# Patient Record
Sex: MALE | Race: OTHER | NOT HISPANIC OR LATINO | URBAN - METROPOLITAN AREA
[De-identification: names, ages, dates, MRNs, and addresses within clinical notes are randomized per-mention and may not be internally consistent; named-entity substitution may affect disease eponyms.]

---

## 2023-07-14 LAB — ACHR BINDING AB, SERUM: 0 NMOL/L (ref 0–0.4)

## 2024-08-16 ENCOUNTER — APPOINTMENT (OUTPATIENT)
Dept: RADIOLOGY | Facility: HOSPITAL | Age: 79
DRG: 063 | End: 2024-08-16
Payer: MEDICARE

## 2024-08-16 ENCOUNTER — APPOINTMENT (OUTPATIENT)
Dept: CARDIOLOGY | Facility: HOSPITAL | Age: 79
DRG: 063 | End: 2024-08-16
Payer: MEDICARE

## 2024-08-16 ENCOUNTER — HOSPITAL ENCOUNTER (INPATIENT)
Facility: HOSPITAL | Age: 79
LOS: 2 days | Discharge: HOME | DRG: 063 | End: 2024-08-18
Attending: EMERGENCY MEDICINE | Admitting: INTERNAL MEDICINE
Payer: MEDICARE

## 2024-08-16 DIAGNOSIS — I63.9 CEREBRAL INFARCTION, UNSPECIFIED (MULTI): ICD-10-CM

## 2024-08-16 DIAGNOSIS — R47.1 DYSARTHRIA: Primary | ICD-10-CM

## 2024-08-16 DIAGNOSIS — I63.89 AC ISCH MULTI VASC TERRITORIES STROKE (MULTI): ICD-10-CM

## 2024-08-16 DIAGNOSIS — I63.539: ICD-10-CM

## 2024-08-16 LAB
ALBUMIN SERPL BCP-MCNC: 4 G/DL (ref 3.4–5)
ALP SERPL-CCNC: 73 U/L (ref 33–136)
ALT SERPL W P-5'-P-CCNC: 41 U/L (ref 10–52)
ANION GAP SERPL CALC-SCNC: 12 MMOL/L (ref 10–20)
APTT PPP: 34 SECONDS (ref 27–38)
AST SERPL W P-5'-P-CCNC: 26 U/L (ref 9–39)
BASOPHILS # BLD AUTO: 0.03 X10*3/UL (ref 0–0.1)
BASOPHILS NFR BLD AUTO: 0.5 %
BILIRUB SERPL-MCNC: 0.4 MG/DL (ref 0–1.2)
BNP SERPL-MCNC: 57 PG/ML (ref 0–99)
BUN SERPL-MCNC: 29 MG/DL (ref 6–23)
CALCIUM SERPL-MCNC: 8.7 MG/DL (ref 8.6–10.3)
CARDIAC TROPONIN I PNL SERPL HS: 3 NG/L (ref 0–20)
CHLORIDE SERPL-SCNC: 110 MMOL/L (ref 98–107)
CHOLEST SERPL-MCNC: 129 MG/DL (ref 0–199)
CHOLESTEROL/HDL RATIO: 4
CO2 SERPL-SCNC: 21 MMOL/L (ref 21–32)
CREAT SERPL-MCNC: 1.19 MG/DL (ref 0.5–1.3)
EGFRCR SERPLBLD CKD-EPI 2021: 63 ML/MIN/1.73M*2
EOSINOPHIL # BLD AUTO: 0.45 X10*3/UL (ref 0–0.4)
EOSINOPHIL NFR BLD AUTO: 7.2 %
ERYTHROCYTE [DISTWIDTH] IN BLOOD BY AUTOMATED COUNT: 13.6 % (ref 11.5–14.5)
GLUCOSE BLD MANUAL STRIP-MCNC: 127 MG/DL (ref 74–99)
GLUCOSE BLD MANUAL STRIP-MCNC: 128 MG/DL (ref 74–99)
GLUCOSE SERPL-MCNC: 129 MG/DL (ref 74–99)
HCT VFR BLD AUTO: 34.5 % (ref 41–52)
HDLC SERPL-MCNC: 32.3 MG/DL
HGB BLD-MCNC: 10.7 G/DL (ref 13.5–17.5)
IMM GRANULOCYTES # BLD AUTO: 0.03 X10*3/UL (ref 0–0.5)
IMM GRANULOCYTES NFR BLD AUTO: 0.5 % (ref 0–0.9)
INR PPP: 1 (ref 0.9–1.1)
LDLC SERPL CALC-MCNC: 49 MG/DL
LYMPHOCYTES # BLD AUTO: 1.88 X10*3/UL (ref 0.8–3)
LYMPHOCYTES NFR BLD AUTO: 30.2 %
MCH RBC QN AUTO: 26.7 PG (ref 26–34)
MCHC RBC AUTO-ENTMCNC: 31 G/DL (ref 32–36)
MCV RBC AUTO: 86 FL (ref 80–100)
MONOCYTES # BLD AUTO: 0.74 X10*3/UL (ref 0.05–0.8)
MONOCYTES NFR BLD AUTO: 11.9 %
NEUTROPHILS # BLD AUTO: 3.1 X10*3/UL (ref 1.6–5.5)
NEUTROPHILS NFR BLD AUTO: 49.7 %
NON HDL CHOLESTEROL: 97 MG/DL (ref 0–149)
NRBC BLD-RTO: 0 /100 WBCS (ref 0–0)
PLATELET # BLD AUTO: 155 X10*3/UL (ref 150–450)
POTASSIUM SERPL-SCNC: 4.3 MMOL/L (ref 3.5–5.3)
PROT SERPL-MCNC: 6.9 G/DL (ref 6.4–8.2)
PROTHROMBIN TIME: 11.8 SECONDS (ref 9.8–12.8)
RBC # BLD AUTO: 4.01 X10*6/UL (ref 4.5–5.9)
SARS-COV-2 RNA RESP QL NAA+PROBE: NOT DETECTED
SODIUM SERPL-SCNC: 139 MMOL/L (ref 136–145)
TRIGL SERPL-MCNC: 240 MG/DL (ref 0–149)
VLDL: 48 MG/DL (ref 0–40)
WBC # BLD AUTO: 6.2 X10*3/UL (ref 4.4–11.3)

## 2024-08-16 PROCEDURE — 87635 SARS-COV-2 COVID-19 AMP PRB: CPT

## 2024-08-16 PROCEDURE — 80061 LIPID PANEL: CPT

## 2024-08-16 PROCEDURE — 2550000001 HC RX 255 CONTRASTS: Performed by: EMERGENCY MEDICINE

## 2024-08-16 PROCEDURE — 85610 PROTHROMBIN TIME: CPT | Performed by: EMERGENCY MEDICINE

## 2024-08-16 PROCEDURE — 99291 CRITICAL CARE FIRST HOUR: CPT | Mod: 25 | Performed by: EMERGENCY MEDICINE

## 2024-08-16 PROCEDURE — 70496 CT ANGIOGRAPHY HEAD: CPT | Performed by: SURGERY

## 2024-08-16 PROCEDURE — 85730 THROMBOPLASTIN TIME PARTIAL: CPT | Performed by: EMERGENCY MEDICINE

## 2024-08-16 PROCEDURE — 82947 ASSAY GLUCOSE BLOOD QUANT: CPT

## 2024-08-16 PROCEDURE — 2500000004 HC RX 250 GENERAL PHARMACY W/ HCPCS (ALT 636 FOR OP/ED): Performed by: EMERGENCY MEDICINE

## 2024-08-16 PROCEDURE — 3E03317 INTRODUCTION OF OTHER THROMBOLYTIC INTO PERIPHERAL VEIN, PERCUTANEOUS APPROACH: ICD-10-PCS | Performed by: EMERGENCY MEDICINE

## 2024-08-16 PROCEDURE — 70450 CT HEAD/BRAIN W/O DYE: CPT

## 2024-08-16 PROCEDURE — 71045 X-RAY EXAM CHEST 1 VIEW: CPT | Performed by: SURGERY

## 2024-08-16 PROCEDURE — 71045 X-RAY EXAM CHEST 1 VIEW: CPT

## 2024-08-16 PROCEDURE — 80053 COMPREHEN METABOLIC PANEL: CPT | Performed by: EMERGENCY MEDICINE

## 2024-08-16 PROCEDURE — 99291 CRITICAL CARE FIRST HOUR: CPT | Performed by: EMERGENCY MEDICINE

## 2024-08-16 PROCEDURE — 70498 CT ANGIOGRAPHY NECK: CPT

## 2024-08-16 PROCEDURE — 36415 COLL VENOUS BLD VENIPUNCTURE: CPT | Performed by: EMERGENCY MEDICINE

## 2024-08-16 PROCEDURE — 70450 CT HEAD/BRAIN W/O DYE: CPT | Performed by: RADIOLOGY

## 2024-08-16 PROCEDURE — 99285 EMERGENCY DEPT VISIT HI MDM: CPT | Mod: 25

## 2024-08-16 PROCEDURE — 83036 HEMOGLOBIN GLYCOSYLATED A1C: CPT | Mod: STJLAB

## 2024-08-16 PROCEDURE — 85025 COMPLETE CBC W/AUTO DIFF WBC: CPT | Performed by: EMERGENCY MEDICINE

## 2024-08-16 PROCEDURE — 83880 ASSAY OF NATRIURETIC PEPTIDE: CPT

## 2024-08-16 PROCEDURE — 2500000001 HC RX 250 WO HCPCS SELF ADMINISTERED DRUGS (ALT 637 FOR MEDICARE OP)

## 2024-08-16 PROCEDURE — 2020000001 HC ICU ROOM DAILY

## 2024-08-16 PROCEDURE — 96374 THER/PROPH/DIAG INJ IV PUSH: CPT

## 2024-08-16 PROCEDURE — 84484 ASSAY OF TROPONIN QUANT: CPT | Performed by: EMERGENCY MEDICINE

## 2024-08-16 PROCEDURE — 70498 CT ANGIOGRAPHY NECK: CPT | Performed by: SURGERY

## 2024-08-16 PROCEDURE — 93005 ELECTROCARDIOGRAM TRACING: CPT

## 2024-08-16 RX ORDER — EZETIMIBE 10 MG/1
10 TABLET ORAL DAILY
COMMUNITY

## 2024-08-16 RX ORDER — HYDRALAZINE HYDROCHLORIDE 25 MG/1
25 TABLET, FILM COATED ORAL EVERY 6 HOURS PRN
Status: DISCONTINUED | OUTPATIENT
Start: 2024-08-18 | End: 2024-08-18 | Stop reason: HOSPADM

## 2024-08-16 RX ORDER — ATORVASTATIN CALCIUM 40 MG/1
40 TABLET, FILM COATED ORAL NIGHTLY
Status: DISCONTINUED | OUTPATIENT
Start: 2024-08-16 | End: 2024-08-17

## 2024-08-16 RX ORDER — POLYETHYLENE GLYCOL 3350 17 G/17G
17 POWDER, FOR SOLUTION ORAL DAILY
Status: DISCONTINUED | OUTPATIENT
Start: 2024-08-16 | End: 2024-08-18 | Stop reason: HOSPADM

## 2024-08-16 RX ORDER — GLIPIZIDE 5 MG/1
2.5 TABLET ORAL DAILY
COMMUNITY

## 2024-08-16 RX ORDER — INSULIN LISPRO 100 [IU]/ML
0-5 INJECTION, SOLUTION INTRAVENOUS; SUBCUTANEOUS
Status: DISCONTINUED | OUTPATIENT
Start: 2024-08-17 | End: 2024-08-18 | Stop reason: HOSPADM

## 2024-08-16 RX ORDER — ASPIRIN 81 MG/1
81 TABLET ORAL DAILY
Status: DISCONTINUED | OUTPATIENT
Start: 2024-08-17 | End: 2024-08-18 | Stop reason: HOSPADM

## 2024-08-16 RX ORDER — OMEPRAZOLE 20 MG/1
20 CAPSULE, DELAYED RELEASE ORAL
COMMUNITY

## 2024-08-16 RX ORDER — SITAGLIPTIN AND METFORMIN HYDROCHLORIDE 50; 500 MG/1; MG/1
1 TABLET, FILM COATED, EXTENDED RELEASE ORAL 2 TIMES DAILY
COMMUNITY

## 2024-08-16 RX ORDER — HYDRALAZINE HYDROCHLORIDE 20 MG/ML
10 INJECTION INTRAMUSCULAR; INTRAVENOUS
Status: DISCONTINUED | OUTPATIENT
Start: 2024-08-16 | End: 2024-08-18 | Stop reason: HOSPADM

## 2024-08-16 RX ORDER — PANTOPRAZOLE SODIUM 40 MG/1
40 TABLET, DELAYED RELEASE ORAL
Status: DISCONTINUED | OUTPATIENT
Start: 2024-08-17 | End: 2024-08-18 | Stop reason: HOSPADM

## 2024-08-16 RX ORDER — ATORVASTATIN CALCIUM 40 MG/1
40 TABLET, FILM COATED ORAL NIGHTLY
Status: ON HOLD | COMMUNITY
End: 2024-08-18

## 2024-08-16 RX ORDER — LABETALOL HYDROCHLORIDE 5 MG/ML
10 INJECTION, SOLUTION INTRAVENOUS EVERY 10 MIN PRN
Status: DISCONTINUED | OUTPATIENT
Start: 2024-08-16 | End: 2024-08-18 | Stop reason: HOSPADM

## 2024-08-16 RX ORDER — FINASTERIDE 5 MG/1
5 TABLET, FILM COATED ORAL NIGHTLY
COMMUNITY

## 2024-08-16 RX ORDER — DOXAZOSIN 4 MG/1
4 TABLET ORAL NIGHTLY
COMMUNITY

## 2024-08-16 RX ORDER — METOPROLOL SUCCINATE 100 MG/1
50 TABLET, EXTENDED RELEASE ORAL DAILY
COMMUNITY

## 2024-08-16 RX ORDER — SEMAGLUTIDE 1.34 MG/ML
1 INJECTION, SOLUTION SUBCUTANEOUS
COMMUNITY

## 2024-08-16 RX ADMIN — IOHEXOL 70 ML: 350 INJECTION, SOLUTION INTRAVENOUS at 19:18

## 2024-08-16 RX ADMIN — ATORVASTATIN CALCIUM 40 MG: 40 TABLET, FILM COATED ORAL at 21:45

## 2024-08-16 RX ADMIN — Medication 18 MG: at 18:30

## 2024-08-16 SDOH — SOCIAL STABILITY: SOCIAL INSECURITY: DO YOU FEEL ANYONE HAS EXPLOITED OR TAKEN ADVANTAGE OF YOU FINANCIALLY OR OF YOUR PERSONAL PROPERTY?: NO

## 2024-08-16 SDOH — SOCIAL STABILITY: SOCIAL INSECURITY: ARE YOU OR HAVE YOU BEEN THREATENED OR ABUSED PHYSICALLY, EMOTIONALLY, OR SEXUALLY BY ANYONE?: NO

## 2024-08-16 SDOH — SOCIAL STABILITY: SOCIAL INSECURITY: ABUSE: ADULT

## 2024-08-16 SDOH — SOCIAL STABILITY: SOCIAL INSECURITY: ARE THERE ANY APPARENT SIGNS OF INJURIES/BEHAVIORS THAT COULD BE RELATED TO ABUSE/NEGLECT?: NO

## 2024-08-16 SDOH — SOCIAL STABILITY: SOCIAL INSECURITY: HAVE YOU HAD THOUGHTS OF HARMING ANYONE ELSE?: NO

## 2024-08-16 SDOH — SOCIAL STABILITY: SOCIAL INSECURITY: HAVE YOU HAD ANY THOUGHTS OF HARMING ANYONE ELSE?: NO

## 2024-08-16 SDOH — SOCIAL STABILITY: SOCIAL INSECURITY: DOES ANYONE TRY TO KEEP YOU FROM HAVING/CONTACTING OTHER FRIENDS OR DOING THINGS OUTSIDE YOUR HOME?: NO

## 2024-08-16 SDOH — SOCIAL STABILITY: SOCIAL INSECURITY: DO YOU FEEL UNSAFE GOING BACK TO THE PLACE WHERE YOU ARE LIVING?: NO

## 2024-08-16 SDOH — SOCIAL STABILITY: SOCIAL INSECURITY: HAS ANYONE EVER THREATENED TO HURT YOUR FAMILY OR YOUR PETS?: NO

## 2024-08-16 SDOH — SOCIAL STABILITY: SOCIAL INSECURITY: WERE YOU ABLE TO COMPLETE ALL THE BEHAVIORAL HEALTH SCREENINGS?: YES

## 2024-08-16 ASSESSMENT — COGNITIVE AND FUNCTIONAL STATUS - GENERAL
DAILY ACTIVITIY SCORE: 24
MOBILITY SCORE: 24
PATIENT BASELINE BEDBOUND: NO
MOBILITY SCORE: 24
DAILY ACTIVITIY SCORE: 24

## 2024-08-16 ASSESSMENT — LIFESTYLE VARIABLES
HOW OFTEN DURING THE LAST YEAR HAVE YOU BEEN UNABLE TO REMEMBER WHAT HAPPENED THE NIGHT BEFORE BECAUSE YOU HAD BEEN DRINKING: NEVER
HAS A RELATIVE, FRIEND, DOCTOR, OR ANOTHER HEALTH PROFESSIONAL EXPRESSED CONCERN ABOUT YOUR DRINKING OR SUGGESTED YOU CUT DOWN: NO
HOW OFTEN DO YOU HAVE 6 OR MORE DRINKS ON ONE OCCASION: NEVER
HOW OFTEN DO YOU HAVE A DRINK CONTAINING ALCOHOL: NEVER
HOW OFTEN DO YOU HAVE A DRINK CONTAINING ALCOHOL: 2-4 TIMES A MONTH
SUBSTANCE_ABUSE_PAST_12_MONTHS: NO
HOW OFTEN DURING THE LAST YEAR HAVE YOU HAD A FEELING OF GUILT OR REMORSE AFTER DRINKING: NEVER
AUDIT TOTAL SCORE: 3
HOW MANY STANDARD DRINKS CONTAINING ALCOHOL DO YOU HAVE ON A TYPICAL DAY: 3 OR 4
HOW OFTEN DURING THE LAST YEAR HAVE YOU FOUND THAT YOU WERE NOT ABLE TO STOP DRINKING ONCE YOU HAD STARTED: NEVER
AUDIT-C TOTAL SCORE: 0
HAVE YOU OR SOMEONE ELSE BEEN INJURED AS A RESULT OF YOUR DRINKING: NO
PRESCIPTION_ABUSE_PAST_12_MONTHS: NO
SKIP TO QUESTIONS 9-10: 1
HOW OFTEN DURING THE LAST YEAR HAVE YOU NEEDED AN ALCOHOLIC DRINK FIRST THING IN THE MORNING TO GET YOURSELF GOING AFTER A NIGHT OF HEAVY DRINKING: NEVER
HOW OFTEN DURING THE LAST YEAR HAVE YOU FAILED TO DO WHAT WAS NORMALLY EXPECTED FROM YOU BECAUSE OF DRINKING: NEVER
AUDIT-C TOTAL SCORE: 0
SKIP TO QUESTIONS 9-10: 0
AUDIT-C TOTAL SCORE: 3
HOW OFTEN DO YOU HAVE SIX OR MORE DRINKS ON ONE OCCASION: NEVER
HOW MANY STANDARD DRINKS CONTAINING ALCOHOL DO YOU HAVE ON A TYPICAL DAY: PATIENT DOES NOT DRINK

## 2024-08-16 ASSESSMENT — PAIN - FUNCTIONAL ASSESSMENT
PAIN_FUNCTIONAL_ASSESSMENT: 0-10
PAIN_FUNCTIONAL_ASSESSMENT: 0-10

## 2024-08-16 ASSESSMENT — ACTIVITIES OF DAILY LIVING (ADL)
DRESSING YOURSELF: INDEPENDENT
WALKS IN HOME: INDEPENDENT
BATHING: INDEPENDENT
PATIENT'S MEMORY ADEQUATE TO SAFELY COMPLETE DAILY ACTIVITIES?: YES
HEARING - RIGHT EAR: FUNCTIONAL
ADEQUATE_TO_COMPLETE_ADL: YES
HEARING - LEFT EAR: FUNCTIONAL
FEEDING YOURSELF: INDEPENDENT
JUDGMENT_ADEQUATE_SAFELY_COMPLETE_DAILY_ACTIVITIES: YES
LACK_OF_TRANSPORTATION: NO
TOILETING: INDEPENDENT
GROOMING: INDEPENDENT

## 2024-08-16 ASSESSMENT — PATIENT HEALTH QUESTIONNAIRE - PHQ9
2. FEELING DOWN, DEPRESSED OR HOPELESS: NOT AT ALL
1. LITTLE INTEREST OR PLEASURE IN DOING THINGS: NOT AT ALL
SUM OF ALL RESPONSES TO PHQ9 QUESTIONS 1 & 2: 0

## 2024-08-16 ASSESSMENT — PAIN SCALES - GENERAL
PAINLEVEL_OUTOF10: 0 - NO PAIN

## 2024-08-16 NOTE — ED PROVIDER NOTES
Emergency Department Provider Note        History of Present Illness     History provided by: Patient  Limitations to History: None  External Records Reviewed with Brief Summary: Outpatient progress note from 2024 which showed history of colon cancer, UTIs, HLD, smoker, NIDDM 2, and CAD    HPI:  Justin Contreras is a 79 y.o. male with history of NIDDM 2, CAD, HLD, colon cancer, and occasional vaping presenting to the ED with complaint of slurred speech.  Patient was said to be dysarthric 30 minutes prior to arrival while he was driving in which wife had him come to the emergency department.  Patient endorses also feeling fatigued for the past few days.  Denies coughing, GI symptoms, fevers, or urinary symptoms.    Last Known Well Time: 172    Physical Exam   Triage vitals:  T 36.2 °C (97.2 °F)  HR 85  /87  RR 18  O2 99 % None (Room air)    General: Awake, alert, in no acute distress  Eyes: Gaze conjugate.  No scleral icterus or injection  HENT: Normo-cephalic, atraumatic. No stridor.  CV: Regular rate, regular rhythm. Radial pulses 2+ bilaterally  Resp: Breathing non-labored, speaking in full sentences.  Clear to auscultation bilaterally  GI: Soft, non-distended, non-tender. No rebound or guarding.  MSK/Extremities: No gross bony deformities. Moving all extremities  Skin: Warm. Appropriate color  Neuro: see below for NIHSS  Psych: Appropriate mood and affect    NIH Stroke Scale  Time: 1800  Person Administering Scale: Luis Belle MD    1a  Level of consciousness: 0=alert; keenly responsive   1b. LOC questions:  0=Performs both tasks correctly   1c. LOC commands: 0=Performs both tasks correctly   2.  Best Gaze: 0=normal   3. Visual: 0=No visual loss   4. Facial Palsy: 0=Normal symmetric movement   5a. Motor left arm: 0=No drift, limb holds 90 (or 45) degrees for full 10 seconds   5b.  Motor right arm: 0=No drift, limb holds 90 (or 45) degrees for full 10 seconds   6a. Motor left le=No drift, limb  holds 90 (or 45) degrees for full 10 seconds   6b  Motor right le=No drift, limb holds 90 (or 45) degrees for full 10 seconds   7. Limb Ataxia: 2=Present in 2 limbs   8.  Sensory: 0=Normal; no sensory loss   9. Best Language:  0=No aphasia, normal   10. Dysarthria: 1=Mild to moderate, patient slurs at least some words and at worst, can be understood with some difficulty   11. Extinction and Inattention: 0=No abnormality     Total:   3     VAN: Negative    Medical Decision Making & ED Course   Medical Decision Makin y.o. male presenting to the ED with complaint of dysarthria 30 minutes prior to arrival.  Patient is afebrile, sinus, hypertensive, saturating well on room air, and in no acute distress.  On exam patient has mild dysarthria and shakiness with left hand finger-to-nose worse than the right as well as an ataxic gait.  Brain attack ordered and CT head without contrast negative for ICH.  Discussed findings with neurologist per ED course.  TNK administered.  ----  Scoring Tools Utilized: NIH Stroke Scale: 3       Differential diagnoses considered include but are not limited to: CVA, UTI, encephalopathy, electrolyte abnormality, COVID,    Social Determinants of Health which Significantly Impact Care: None identified     EKG Independent Interpretation: EKG interpreted by myself. Please see ED Course for full interpretation.    Independent Result Review and Interpretation: Relevant laboratory and radiographic results were reviewed and independently interpreted by myself.  As necessary, they are commented on in the ED Course.    Chronic conditions affecting the patient's care: As documented above in [facility]    The patient was discussed with the following consultants/services: Neurology regarding NIHSS and concern for cerebellar CVA.  Admitting ICU service who agreed to accept the patient.    Care Considerations: As documented above in [facility]    IV Thrombolysis IV Thrombolysis Checklist        IV Thrombolysis  Given: Yes Thrombolysis Administration: administration time 1830 Patient meets inclusion and exclusion criteria with expected benefits exceeding the risks of IV thrombolysis therapy or withholding therapy. Patient/ family understand potential risks & benefits and consent to IV Thrombolysis. Discussed the diagnosis of suspected ischemic stroke and options for treatment, including alternative treatments. For patients meeting inclusion and exclusion criteria, the expected benefits exceed the risks of IV thrombolysis therapy or withholding therapy. The main risk of IV thrombolysis therapy is bleeding into the brain or body that may require blood transfusions or lead to death. In clinical studies, the rate of death was not higher in patients who received IV thrombolysis compared to those who did not. Other risks include allergic reactions, and with any procedure there is always the possibility of an unexpected complication.  Patient is <18 - [place] [facility] [place] with Acute suspected Stroke & the Pediatric IV Thrombolysis Consent. Consent is completed on a paper document and if criteria is met/ benefit outweigh the risk the thrombolytic Alteplase should be given.  :99}Were there delays to thrombolysis administration?: no      ED Course:  ED Course as of 08/17/24 0002   Fri Aug 16, 2024   1815 CT brain attack head wo IV contrast  Unimpressive for ICH or mass. [ES]   1817 Spoke with neurologist, Dr. Fischer, who agrees against TNK at this time due to lack of disabling deficits but recommends for admission due for CVA work-up due to age and risk factors with symptoms. [ES]   1822 Supervising attending appreciated mild left-sided finger to nose ataxia, left worse then right, and unsteadiness on gait. Attending spoke with neurologist for concern of possible cerbellar stroke and desire for TNK administration. TNK agreed upon and ordered. [ES]   1900 XR chest 1 view  Patchy opacities in  lobes bilaterally suggestive of atelectasis versus pneumonia.  Patient does not clinically have pneumonia, saturating well on room air. BNP ordered to assess for CHF.  No widening of the mediastinum, cardiomegaly, or consolidations. [ES]   1908 Patient reassessed and reports feeling somewhat better and having more clarity.  [ES]      ED Course User Index  [ES] Luis Belle MD         Diagnoses as of 08/17/24 0002   Dysarthria       Disposition   As a result of their workup, the patient will require admission to the hospital.  The patient was informed of his diagnosis.  The patient was given the opportunity to ask questions and I answered them. The patient agreed to be admitted to the hospital.    Procedures   Critical Care    Performed by: Lenny Hanna DO  Authorized by: Lenny Hanna DO    Critical care provider statement:     Critical care time (minutes):  35    Critical care time was exclusive of:  Separately billable procedures and treating other patients and teaching time    Critical care was necessary to treat or prevent imminent or life-threatening deterioration of the following conditions:  CNS failure or compromise (CVA given TNK)    Critical care was time spent personally by me on the following activities:  Discussions with consultants, development of treatment plan with patient or surrogate, examination of patient, ordering and review of radiographic studies, ordering and review of laboratory studies and ordering and performing treatments and interventions      Patient seen and discussed with ED attending physician.    Luis Belle MD  Emergency Medicine     Luis Belle MD  Resident  08/17/24 0002    The patient was seen by the resident/fellow.  I have personally performed a substantive portion of the encounter.  I have seen and examined the patient; agree with the workup, evaluation, MDM, management and diagnosis.  The care plan has been discussed with the resident; I have reviewed the resident’s  note and agree with the documented findings.      I was present for the entirety of the procedure(s).                              Lenny Hanna,   08/20/24 1005

## 2024-08-17 ENCOUNTER — APPOINTMENT (OUTPATIENT)
Dept: RADIOLOGY | Facility: HOSPITAL | Age: 79
DRG: 063 | End: 2024-08-17
Payer: MEDICARE

## 2024-08-17 LAB
ALBUMIN SERPL BCP-MCNC: 3.7 G/DL (ref 3.4–5)
ALP SERPL-CCNC: 65 U/L (ref 33–136)
ALT SERPL W P-5'-P-CCNC: 35 U/L (ref 10–52)
ANION GAP SERPL CALC-SCNC: 13 MMOL/L (ref 10–20)
AST SERPL W P-5'-P-CCNC: 20 U/L (ref 9–39)
ATRIAL RATE: 73 BPM
BASOPHILS # BLD AUTO: 0.03 X10*3/UL (ref 0–0.1)
BASOPHILS NFR BLD AUTO: 0.5 %
BILIRUB SERPL-MCNC: 0.4 MG/DL (ref 0–1.2)
BUN SERPL-MCNC: 24 MG/DL (ref 6–23)
CALCIUM SERPL-MCNC: 8.9 MG/DL (ref 8.6–10.3)
CHLORIDE SERPL-SCNC: 112 MMOL/L (ref 98–107)
CO2 SERPL-SCNC: 19 MMOL/L (ref 21–32)
CREAT SERPL-MCNC: 1.04 MG/DL (ref 0.5–1.3)
EGFRCR SERPLBLD CKD-EPI 2021: 74 ML/MIN/1.73M*2
EOSINOPHIL # BLD AUTO: 0.49 X10*3/UL (ref 0–0.4)
EOSINOPHIL NFR BLD AUTO: 7.9 %
ERYTHROCYTE [DISTWIDTH] IN BLOOD BY AUTOMATED COUNT: 13.5 % (ref 11.5–14.5)
EST. AVERAGE GLUCOSE BLD GHB EST-MCNC: 148 MG/DL
GLUCOSE BLD MANUAL STRIP-MCNC: 116 MG/DL (ref 74–99)
GLUCOSE BLD MANUAL STRIP-MCNC: 120 MG/DL (ref 74–99)
GLUCOSE BLD MANUAL STRIP-MCNC: 136 MG/DL (ref 74–99)
GLUCOSE SERPL-MCNC: 103 MG/DL (ref 74–99)
HBA1C MFR BLD: 6.8 %
HCT VFR BLD AUTO: 32.1 % (ref 41–52)
HGB BLD-MCNC: 10.1 G/DL (ref 13.5–17.5)
IMM GRANULOCYTES # BLD AUTO: 0.02 X10*3/UL (ref 0–0.5)
IMM GRANULOCYTES NFR BLD AUTO: 0.3 % (ref 0–0.9)
LYMPHOCYTES # BLD AUTO: 1.36 X10*3/UL (ref 0.8–3)
LYMPHOCYTES NFR BLD AUTO: 21.9 %
MAGNESIUM SERPL-MCNC: 1.46 MG/DL (ref 1.6–2.4)
MCH RBC QN AUTO: 26.5 PG (ref 26–34)
MCHC RBC AUTO-ENTMCNC: 31.5 G/DL (ref 32–36)
MCV RBC AUTO: 84 FL (ref 80–100)
MONOCYTES # BLD AUTO: 0.74 X10*3/UL (ref 0.05–0.8)
MONOCYTES NFR BLD AUTO: 11.9 %
NEUTROPHILS # BLD AUTO: 3.58 X10*3/UL (ref 1.6–5.5)
NEUTROPHILS NFR BLD AUTO: 57.5 %
NRBC BLD-RTO: 0 /100 WBCS (ref 0–0)
P AXIS: 42 DEGREES
P OFFSET: 188 MS
P ONSET: 138 MS
PHOSPHATE SERPL-MCNC: 4.3 MG/DL (ref 2.5–4.9)
PLATELET # BLD AUTO: 143 X10*3/UL (ref 150–450)
POTASSIUM SERPL-SCNC: 4.1 MMOL/L (ref 3.5–5.3)
PR INTERVAL: 162 MS
PROT SERPL-MCNC: 6.4 G/DL (ref 6.4–8.2)
Q ONSET: 219 MS
QRS COUNT: 12 BEATS
QRS DURATION: 80 MS
QT INTERVAL: 362 MS
QTC CALCULATION(BAZETT): 398 MS
QTC FREDERICIA: 386 MS
R AXIS: 7 DEGREES
RBC # BLD AUTO: 3.81 X10*6/UL (ref 4.5–5.9)
SODIUM SERPL-SCNC: 140 MMOL/L (ref 136–145)
T AXIS: 37 DEGREES
T OFFSET: 400 MS
VENTRICULAR RATE: 73 BPM
WBC # BLD AUTO: 6.2 X10*3/UL (ref 4.4–11.3)

## 2024-08-17 PROCEDURE — 70551 MRI BRAIN STEM W/O DYE: CPT

## 2024-08-17 PROCEDURE — 2500000004 HC RX 250 GENERAL PHARMACY W/ HCPCS (ALT 636 FOR OP/ED)

## 2024-08-17 PROCEDURE — 80053 COMPREHEN METABOLIC PANEL: CPT

## 2024-08-17 PROCEDURE — 83735 ASSAY OF MAGNESIUM: CPT

## 2024-08-17 PROCEDURE — 99223 1ST HOSP IP/OBS HIGH 75: CPT | Performed by: PSYCHIATRY & NEUROLOGY

## 2024-08-17 PROCEDURE — 82947 ASSAY GLUCOSE BLOOD QUANT: CPT

## 2024-08-17 PROCEDURE — 85025 COMPLETE CBC W/AUTO DIFF WBC: CPT

## 2024-08-17 PROCEDURE — 2500000001 HC RX 250 WO HCPCS SELF ADMINISTERED DRUGS (ALT 637 FOR MEDICARE OP)

## 2024-08-17 PROCEDURE — 84100 ASSAY OF PHOSPHORUS: CPT

## 2024-08-17 PROCEDURE — 1100000001 HC PRIVATE ROOM DAILY

## 2024-08-17 PROCEDURE — 36415 COLL VENOUS BLD VENIPUNCTURE: CPT

## 2024-08-17 PROCEDURE — 99291 CRITICAL CARE FIRST HOUR: CPT

## 2024-08-17 PROCEDURE — 97161 PT EVAL LOW COMPLEX 20 MIN: CPT | Mod: GP | Performed by: PHYSICAL THERAPIST

## 2024-08-17 PROCEDURE — 70551 MRI BRAIN STEM W/O DYE: CPT | Performed by: RADIOLOGY

## 2024-08-17 PROCEDURE — 2500000002 HC RX 250 W HCPCS SELF ADMINISTERED DRUGS (ALT 637 FOR MEDICARE OP, ALT 636 FOR OP/ED)

## 2024-08-17 PROCEDURE — S4991 NICOTINE PATCH NONLEGEND: HCPCS

## 2024-08-17 RX ORDER — METOPROLOL SUCCINATE 50 MG/1
50 TABLET, EXTENDED RELEASE ORAL DAILY
Status: DISCONTINUED | OUTPATIENT
Start: 2024-08-17 | End: 2024-08-18 | Stop reason: HOSPADM

## 2024-08-17 RX ORDER — NICOTINE 7MG/24HR
1 PATCH, TRANSDERMAL 24 HOURS TRANSDERMAL DAILY
Status: DISCONTINUED | OUTPATIENT
Start: 2024-08-17 | End: 2024-08-18 | Stop reason: HOSPADM

## 2024-08-17 RX ORDER — SODIUM BICARBONATE 650 MG/1
650 TABLET ORAL ONCE
Status: DISCONTINUED | OUTPATIENT
Start: 2024-08-17 | End: 2024-08-17

## 2024-08-17 RX ORDER — MAGNESIUM SULFATE HEPTAHYDRATE 40 MG/ML
2 INJECTION, SOLUTION INTRAVENOUS ONCE
Status: COMPLETED | OUTPATIENT
Start: 2024-08-17 | End: 2024-08-17

## 2024-08-17 RX ORDER — LANOLIN ALCOHOL/MO/W.PET/CERES
400 CREAM (GRAM) TOPICAL DAILY
Status: DISCONTINUED | OUTPATIENT
Start: 2024-08-17 | End: 2024-08-18 | Stop reason: HOSPADM

## 2024-08-17 RX ORDER — IBUPROFEN 200 MG
1 TABLET ORAL DAILY
Status: DISCONTINUED | OUTPATIENT
Start: 2024-08-17 | End: 2024-08-17

## 2024-08-17 RX ORDER — ATORVASTATIN CALCIUM 80 MG/1
80 TABLET, FILM COATED ORAL NIGHTLY
Status: DISCONTINUED | OUTPATIENT
Start: 2024-08-17 | End: 2024-08-18 | Stop reason: HOSPADM

## 2024-08-17 RX ADMIN — MAGNESIUM SULFATE HEPTAHYDRATE 2 G: 40 INJECTION, SOLUTION INTRAVENOUS at 05:05

## 2024-08-17 RX ADMIN — METOPROLOL SUCCINATE 50 MG: 50 TABLET, EXTENDED RELEASE ORAL at 08:31

## 2024-08-17 RX ADMIN — NICOTINE 1 PATCH: 21 PATCH, EXTENDED RELEASE TRANSDERMAL at 08:31

## 2024-08-17 RX ADMIN — ATORVASTATIN CALCIUM 80 MG: 80 TABLET, FILM COATED ORAL at 21:52

## 2024-08-17 RX ADMIN — SODIUM BICARBONATE 650 MG: 650 TABLET ORAL at 10:17

## 2024-08-17 RX ADMIN — PANTOPRAZOLE SODIUM 40 MG: 40 TABLET, DELAYED RELEASE ORAL at 06:43

## 2024-08-17 RX ADMIN — Medication 400 MG: at 10:17

## 2024-08-17 ASSESSMENT — PAIN SCALES - GENERAL
PAINLEVEL_OUTOF10: 0 - NO PAIN

## 2024-08-17 ASSESSMENT — PAIN - FUNCTIONAL ASSESSMENT
PAIN_FUNCTIONAL_ASSESSMENT: 0-10

## 2024-08-17 ASSESSMENT — COGNITIVE AND FUNCTIONAL STATUS - GENERAL
MOBILITY SCORE: 24
DAILY ACTIVITIY SCORE: 24
DAILY ACTIVITIY SCORE: 24

## 2024-08-17 NOTE — PROGRESS NOTES
Physical Therapy    Physical Therapy Evaluation    Patient Name: Justin Contreras  MRN: [id number]  Today's Date: 8/17/2024   Time Calculation  Start Time: 1035  Stop Time: 1050  Time Calculation (min): 15 min  2121/2121-A    Assessment/Plan   PT Assessment  Evaluation/Treatment Tolerance: Patient tolerated treatment well  Medical Staff Made Aware: Yes  End of Session Communication: Bedside nurse  Assessment Comment: Pt is a 79 y.o. male admitted for Dysarthria [R47.1] on 8/16/2024. Pt below functional level and will benefit from skilled therapy during stay to improve overall functional mobility, strength, ROM, endurance and safety awareness. Pt does not require any further therapy at this time.     End of Session Patient Position: Bed, 3 rail up  IP OR SWING BED PT PLAN  Inpatient or Swing Bed: Inpatient  PT Plan  PT Plan: PT Eval only  PT Discharge Recommendations: No further acute PT, No PT needed after discharge  PT Recommended Transfer Status: Independent  PT - OK to Discharge: Yes    Subjective     Current Problem:  1. Dysarthria          Patient Active Problem List   Diagnosis    Dysarthria       General Visit Information:  General  Reason for Referral: impaired mobility  Referred By: Dr. English  Past Medical History Relevant to Rehab: TNK recieved at 1630 8/16  Family/Caregiver Present: Yes  Caregiver Feedback: wife present  Prior to Session Communication: Bedside nurse  Patient Position Received: Bed, 3 rail up  Preferred Learning Style: kinesthetic, verbal  General Comment: Pt agreeable to therapy    Home Living:  Home Living  Type of Home: House  Home Layout: Two level, Bed/bath upstairs, Stairs to alternate level with rails  Alternate Level Stairs-Rails: Right  Alternate Level Stairs-Number of Steps: 13    Prior Level of Function:  Prior Function Per Pt/Caregiver Report  Level of [place]: Independent with ADLs and functional transfers, Independent with homemaking with  ambulation    Precautions:  Precautions  Medical Precautions: Fall precautions    Vital Signs: WFL     Objective     Pain:0/10       Cognition:  Cognition  Overall Cognitive Status: Within Functional Limits  Orientation Level: Oriented X4    General Assessments:      Activity Tolerance  Endurance: Tolerates 30 min exercise with multiple rests        Static Sitting Balance  Static Sitting-Comment/Number of Minutes: good  Dynamic Sitting Balance  Dynamic Sitting-Comments: good  Static Standing Balance  Static Standing-Comment/Number of Minutes: good  Dynamic Standing Balance  Dynamic Standing-Comments: good    Functional Assessments:     Bed Mobility  Bed Mobility: Yes  Bed Mobility 1  Bed Mobility 1: Supine to sitting  Level of Assistance 1: Modified independent  Transfers  Transfer: Yes  Transfer 1  Technique 1: Sit to stand, Stand to sit  Transfer Level of Assistance 1: Independent  Ambulation/Gait Training  Ambulation/Gait Training Performed: Yes  Ambulation/Gait Training 1  Surface 1: Level tile  Device 1: No device  Assistance 1: Independent  Comments/Distance (ft) 1: 300     Extremity/Trunk Assessments:        RLE   RLE : Within Functional Limits  LLE   LLE : Within Functional Limits    Outcome Measures:     [facility] Basic Mobility  Turning from your back to your side while in a flat bed without using bedrails: None  Moving from lying on your back to sitting on the side of a flat bed without using bedrails: None  Moving to and from bed to chair (including a wheelchair): None  Standing up from a chair using your arms (e.g. wheelchair or bedside chair): None  To walk in hospital room: None  Climbing 3-5 steps with railing: None  Basic Mobility - Total Score: 24       Education Documentation  Body Mechanics, taught by Nasrin Ward PT at 8/17/2024 12:24 PM.  Learner: Patient  Readiness: Acceptance  Method: Explanation  Response: Verbalizes Understanding    Mobility Training, taught by Nasrin Ward PT at  8/17/2024 12:24 PM.  Learner: Patient  Readiness: Acceptance  Method: Explanation  Response: Verbalizes Understanding    Education Comments  No comments found.

## 2024-08-17 NOTE — ASSESSMENT & PLAN NOTE
This is a 79 year old male presenting for evaluation of stroke.  He presented with dysarthria and limb ataxia.  TNK was administered at 18:30.  Currently, he has no deficits on exam (NIHSS 0)    Post-TNK Care  - hold AP/AC agents for now until imaging 24 hours post TNK has shown no ICH  - MRI Brain  - Echocardiogram  - goal BP < 180/105  - goal LDL < 70; continue Atorvastatin  - goal hemoglobin A1C < 6.8  - encouraged cessation of vaping  - neuro checks per protocal  - cardiac telemetry  - SCDs for DVT proph    Case discussed with Dr. English,    Alessio Fischer MD  [facility]  Department of Neurology

## 2024-08-17 NOTE — PROGRESS NOTES
Spiritual Care Visit    Clinical Encounter Type  Visited With: Patient and family together  Routine Visit: Introduction          I had a nice, short visit with patient and his wife.  He said he was feeling normal again and how important health is.  We briefly talked about the weather and I thanked them for letting me stop to visit.

## 2024-08-17 NOTE — CARE PLAN
Problem: General Stroke  Goal: Establish a mutual long term goal with patient by discharge  Outcome: Progressing  Goal: Demonstrate improvement in neurological exam throughout the shift  Outcome: Progressing  Goal: Maintain BP within ordered limits throughout shift  Outcome: Progressing  Goal: Participate in treatment (ie., meds, therapy) throughout shift  Outcome: Progressing  Goal: No symptoms of aspiration throughout shift  Outcome: Progressing  Goal: No symptoms of hemorrhage throughout shift  Outcome: Progressing  Goal: Tolerate enteral feeding throughout shift  Outcome: Progressing  Goal: Decreased nausea/vomiting throughout shift  Outcome: Progressing  Goal: Controlled blood glucose throughout shift  Outcome: Progressing  Goal: Out of bed three times today  Outcome: Progressing     Problem: ICU Stroke  Goal: Maintain ICP within ordered limits throughout shift  Outcome: Progressing  Goal: Tolerate EVD clamping trial throughout shift  Outcome: Progressing  Goal: Tolerate ventilator weaning trial during shift  Outcome: Progressing  Goal: Maintain patent airway throughout shift  Outcome: Progressing  Goal: Achieve/maintain targeted sodium level throughout shift  Outcome: Progressing     Problem: Pain - Adult  Goal: Verbalizes/displays adequate comfort level or baseline comfort level  Outcome: Progressing     Problem: Safety - Adult  Goal: Free from fall injury  Outcome: Progressing     Problem: Discharge Planning  Goal: Discharge to home or other facility with appropriate resources  Outcome: Progressing     Problem: Chronic Conditions and Co-morbidities  Goal: Patient's chronic conditions and co-morbidity symptoms are monitored and maintained or improved  Outcome: Progressing       The clinical goals for the shift include patient to remain hemodynamically stable

## 2024-08-17 NOTE — CARE PLAN
The patient's goals for the shift include  complete recovery     The clinical goals for the shift include patient to remain hemodynamically stable    Over the shift, the patient did not make progress toward the following goals. Barriers to progression include acuteness of illness. Recommendations to address these barriers include continue with plan of care.

## 2024-08-17 NOTE — PROGRESS NOTES
Occupational Therapy                 Therapy Communication Note    Patient Name: Justin Contreras  MRN: [id number]  Today's Date: 8/17/2024     Discipline: Occupational Therapy    Screen: Chart review completed. Patient is independent and back to baseline per PT. Will discharge OT order at this time.

## 2024-08-17 NOTE — PROGRESS NOTES
Critical Care Daily Progress        Subjective   Patient is a 79 y.o. male admitted on 8/16/2024  6:04 PM with the following indication(s) for ICU care CVA. h/o DM, CAD s/p CABG 2016 who received TNK after NIHSS of 3  pm yesterday with persistent numbness in his fingertips and soles of his feet.     Overnight Events: NAEO    Scheduled Medications:   aspirin, 81 mg, oral, Daily  atorvastatin, 40 mg, oral, Nightly  insulin lispro, 0-5 Units, subcutaneous, TID  metoprolol succinate XL, 50 mg, oral, Daily  nicotine, 1 patch, transdermal, Daily  pantoprazole, 40 mg, oral, Daily before breakfast  polyethylene glycol, 17 g, oral, Daily         Continuous Medications:         PRN Medications:   PRN medications: hydrALAZINE **FOLLOWED BY** [START ON 8/18/2024] hydrALAZINE, labetaloL, oxygen    Objective   Vitals:  Temp  Min: 35.4 °C (95.7 °F)  Max: 37 °C (98.6 °F)  Pulse  Min: 58  Max: 110  BP  Min: 107/57  Max: 176/104  Resp  Min: 15  Max: 42  SpO2  Min: 96 %  Max: 100 %    Physical Exam:   Physical Exam   Physical Exam    General: Alert, no acute distress, well developed, Well hydrated  Head: NCAT  Eyes: Clear conjunctiva, EOMI  ENT: Moist mucosa, no lymphadenopathy  Respiratory: Normal respiratory effort. CTAB. Symmetric aeration. No wheezes, rales, or Rhonchi.  CV: Normal rhythm, Normal Rate, pulses present bilaterally  GI: Soft, NT, no RBT, no guarding, No distention  : no flank tenderness, no cva tenderness  MSK: Atraumatic. Full ROM in extremities. Bilateral symmetric intact strength. No pedal edema.  Skin: Warm, c/d/i  Neuro: AOx3, No slurred speech with no ataxia normal F2N and normal gait today.     Assessment/Plan   Overall Assessment:    Results from last 7 days   Lab Units 08/17/24  0349 08/16/24  1816   INR   --  1.0   APTT seconds  --  34   ALK PHOS U/L 65 73   AST U/L 20 26   ALT U/L 35 41     - Last BM: Last BM Date: 08/17/24    Results from last 7 days   Lab Units 08/17/24  0349 08/16/24  1816    SODIUM mmol/L 140 139   POTASSIUM mmol/L 4.1 4.3   MAGNESIUM mg/dL 1.46*  --    PHOSPHORUS mg/dL 4.3  --    BUN mg/dL 24* 29*   CREATININE mg/dL 1.04 1.19       Net IO Since Admission: 0 mL [24 0839]    Results from last 7 days   Lab Units 24  0750 24  0349 24  2052 24  1816 24  1758   POCT GLUCOSE mg/dL 116*  --  128*  --  127*   GLUCOSE mg/dL  --  103*  --  129*  --    TRIGLYCERIDES mg/dL  --   --   --  240*  --       Results from last 7 days   Lab Units 24  0349 24  1816   HEMOGLOBIN g/dL 10.1* 10.7*   HEMATOCRIT % 32.1* 34.5*   PLATELETS AUTO x10*3/uL 143* 155       Results from last 7 days   Lab Units 24  0349 24  1816   WBC AUTO x10*3/uL 6.2 6.2     Temp (24hrs), Av.4 °C (97.6 °F), Min:35.4 °C (95.7 °F), Max:37 °C (98.6 °F)   LDA:     Neuro:   Stroke  -Patient initially had a NIH score of 3.  He was given TNK at 1630.  Admitted for post TNK observation.  -Neurochecks initially every 15 for 1 hour then Q1 till eventually every 4 per TNK protocol.  Repeat imaging at 24 hours.  -CT head:1.  No acute intracranial hemorrhage or acute territorial infarct.  2.  Diffuse parenchymal volume loss. Chronic microvascular ischemic  changes. Encephalomalacia at the right temporooccipital region,  suggestive of remote infarct.  -Neurology consulted  -CAM ICU   -CTA neg  -MRI pending    Cardiac:  Hx of CAD status post CABG  -Blood pressure goals status post TNK.  As needed hydralazine and labetalol ordered.  To keep the systolic less than 180.  -Continue home atorvastatin, metoprolol  - Goals: MAP> 65, HR      Pulmonary:      Continuous pulse oximetry   O2 PRN to maintain SpO2 > 94%, wean as tolerated     Gastrointestinal:   - Diet: Regular diet patient passed swallow evaluation.  - Prophylaxis: Protonix based on home medication  - Bowel regimen: MiraLAX as needed  - Last BM:       Renal:   - Daily CMP,Mg,Phos  #hypomagnesemia  -replete and rx magnesium on  discharge for prior colonic pathology  - Electrolytes: Replete per protocol, goal K>4 Phos >3 Mg >2     Endocrine:   Type 2 diabetes  -Holding home diabetic medication.  Sliding scale implemented.  -sliding scale: Moderate  -BG < 180 goal     Hematology:   - Daily CBC, coags  - DVT Prophylaxis: Holding due to TNK, scds       Infectious Disease:   -No Acute infectious concerns    Musculoskeletal:   - PT/OT to see once cleared from TNK protocol.     Integumentary:   -Per ICU skin protocol     LDA:          CODE STATUS: Full Code     Disposition:  SUP - Protonix      Annie Armendariz MD

## 2024-08-17 NOTE — CONSULTS
Inpatient consult to Neurology  Consult performed by: Alessio Fischer MD  Consult ordered by: Farzana English MD          History Of Present Illness  Justin Contreras is a 79 y.o. male presenting with stroke.    The patient states that yesterday, he was coming out of a [employer] - he went to go change the seat position.  Just after this, he developed dizziness.  He describes a feeling like things were moving in his visual field.  He was able to drive home.  At home, his wife noted that his speech was slurred.  He was able to walk upstairs but felt off balance.  His wife had to help him down the stairs.  He denied any double vision, loss of peripheral vision, facial droop, focal weakness or numbness.  He came to the ED.  ED attending noted limb ataxia.  TNK was administered.    Last known well:  at 17:27     Had stroke symptoms resolved at time of presentation: No  Past Medical History;  CAD  History of Colon Cancer  Diabetes      Surgical History  Surgery for colon cancer in   CABG in     Social History  Social History     Tobacco Use    Smoking status: Former     Current packs/day: 0.00     Average packs/day: 0.5 packs/day for 30.0 years (15.0 ttl pk-yrs)     Types: Cigarettes     Start date: 1987     Quit date: 2016     Years since quittin.6     Passive exposure: Past    Smokeless tobacco: Former   Vaping Use    Vaping status: Some Days    Substances: Nicotine   Substance Use Topics    Alcohol use: Yes     Alcohol/week: 2.0 standard drinks of alcohol     Types: 2 Glasses of wine per week    Drug use: Never     Allergies  Patient has no known allergies.  Home Medications  Medications Prior to Admission   Medication Sig Dispense Refill Last Dose    atorvastatin (Lipitor) 40 mg tablet Take 1 tablet (40 mg) by mouth once daily at bedtime.   8/15/2024    doxazosin (Cardura) 4 mg tablet Take 1 tablet (4 mg) by mouth once daily at bedtime.   8/15/2024    ezetimibe (Zetia) 10 mg tablet  "Take 1 tablet (10 mg) by mouth once daily.   8/16/2024 at am    finasteride (Proscar) 5 mg tablet Take 1 tablet (5 mg) by mouth once daily at bedtime. Do not crush, chew, or split.   8/15/2024    glipiZIDE (Glucotrol) 5 mg tablet Take 0.5 tablets (2.5 mg) by mouth once daily.   8/16/2024 at am    metoprolol succinate XL (Toprol-XL) 100 mg 24 hr tablet Take 0.5 tablets (50 mg) by mouth once daily. Do not crush or chew.   8/16/2024 at am    omeprazole (PriLOSEC) 20 mg DR capsule Take 1 capsule (20 mg) by mouth once daily in the morning. Take before meals. Do not crush or chew.   8/16/2024 at am    semaglutide (Ozempic) 1 mg/dose (4 mg/3 mL) pen injector Inject 1 mg under the skin 1 (one) time per week. Monday   Past Week    SITagliptin phos-metformin (Janumet XR)  mg tablet, ER multiphase 24 hr Take 1 tablet by mouth 2 times a day.   8/16/2024 at am       Review of Systems  Neurological Exam  Physical Exam  Last Recorded Vitals  Blood pressure 124/68, pulse 68, temperature 36.2 °C (97.2 °F), temperature source Temporal, resp. rate 18, height 1.702 m (5' 7.01\"), weight 71.6 kg (157 lb 13.6 oz), SpO2 98%.    Gen: NAD  Neuro:  --HIF: A&O X 3, repetition and naming intact  --CN:  PERRLA, EOMI, VFF, no visible facial asymmetry, facial sensation intact, no tongue or palatal deviation, SCM intact  --Motor: Moves all 4 extremities equally; no focal deficits  --Sensory: Intact to light touch, intact to pinprick  --Reflex: 2+ symmetric, toes down  --Cerebellum: FTN and HTS intact  --Gait: Deferred         Relevant Results      NIH Stroke Scale  1A. Level of Consciousness: Alert, Keenly Responsive  1B. Ask Month and Age: Both Questions Right  1C. Blink Eyes & Squeeze Hands: Performs Both Tasks  2. Best Gaze: Normal  3. Visual: No Visual Loss  4. Facial Palsy: Normal Symmetrical Movements  5A. Motor - Left Arm: No Drift  5B. Motor - Right Arm: No Drift  6A. Motor - Left Leg: No Drift  6B. Motor - Right Leg: No Drift  7. " Limb Ataxia: Absent  8. Sensory Loss: Normal  9. Best Language: No Aphasia  10. Dysarthria: Normal  11. Extinction and Inattention: No Abnormality  NIH Stroke Scale: 0           [place] Coma Scale  Best Eye Response: Spontaneous  Best Verbal Response: Oriented  Best Motor Response: Follows commands  [place] Coma Scale Score: 15                No MRI head results found for the past 14 days  No CT head results found for the past 14 days  No echocardiogram results found for the past 14 days      Results from last 7 days   Lab Units 08/16/24  1816   HEMOGLOBIN A1C % 6.8*     BNP   Date/Time Value Ref Range Status   08/16/2024 06:16 PM 57 0 - 99 pg/mL Final        I have personally reviewed the following imaging results ECG 12 lead    Result Date: 8/17/2024  Normal sinus rhythm Normal ECG When compared with ECG of 17-AUG-2007 22:57, No significant change was found    CT angio head and neck w and wo IV contrast    Result Date: 8/16/2024  Interpreted By:  Larry Segal, STUDY: CT ANGIO HEAD AND NECK W AND WO IV CONTRAST;  8/16/2024 7:40 pm   INDICATION: Signs/Symptoms:ataxia, dysarthria.   COMPARISON: Correlation made to noncontrast head CT of same day.   ACCESSION NUMBER(S): [id number]   ORDERING CLINICIAN: MICHELLE LOUIS   TECHNIQUE: 70 cc Omnipaque 350 were administered intravenously and axial images of the head and neck were acquired.  Coronal, sagittal, and 3-D reconstructions were provided for review.   FINDINGS:     CTA HEAD FINDINGS:   Anterior circulation: Mild atherosclerotic calcification in the intracranial internal carotid arteries without significant luminal narrowing. The bilateral intracranial internal carotid arteries, bilateral carotid terminals, bilateral proximal anterior and middle cerebral arteries are otherwise normal.   Posterior circulation: Intradural vertebral, basilar and posterior cerebral arteries appear patent without definite hemodynamically significant narrowing, with dominant supply to  posterior cerebral arteries provided by prominent posterior communicating arteries. There is diffusely smooth diminutive caliber of left vertebral and basilar arteries, favored to relate to developmental variability, with or without some degree of atherosclerotic narrowing. Asymmetrically diminutive right vertebral artery appears to terminate in PICA, anatomic variability.   No intracranial saccular aneurysm or other abnormal enhancement is seen.     CTA NECK FINDINGS:   Right carotid vessels: The common carotid artery is normal. The carotid bifurcation is normal. The internal carotid artery in the neck is normal. 0% ICA narrowing by NASCET criteria.   Left carotid vessels: The common carotid artery is normal. The carotid bifurcation is normal. The internal carotid artery in the neck is normal. 0% ICA narrowing by NASCET criteria.   Vertebral vessels:  The visualized segments of the cervical vertebral arteries are normal in caliber.         No evidence for significant stenosis of the cervical vessels.   No evidence for significant stenosis or large branch vessel cutoffs of the intracranial vessels.   MACRO: None   Signed by: Larry Segal 8/16/2024 8:08 PM Dictation workstation:   [id number]    XR chest 1 view    Result Date: 8/16/2024  Interpreted By:  Larry Segal, STUDY: XR CHEST 1 VIEW;  8/16/2024 6:19 pm   INDICATION: Signs/Symptoms:slurred speech.   COMPARISON: None.   ACCESSION NUMBER(S): [id number]   ORDERING CLINICIAN: MICHELLE LOUIS   FINDINGS: AP radiograph of the chest was provided.   Limited by portable technique and patient soft tissue attenuation factors. Leads overlie the chest, partially obscuring the field-of-view.   Median sternotomy changes.   CARDIOMEDIASTINAL SILHOUETTE: Cardiomediastinal silhouette is normal in size and configuration. CABG.   LUNGS: Patchy opacities in both lung bases may relate to atelectasis or pneumonia. Questionable trace pleural effusions. No pneumothorax.   ABDOMEN: No  remarkable upper abdominal findings.   BONES: No acute osseous changes.       1. Patchy opacities in both lung bases may relate to atelectasis or pneumonia. Questionable trace pleural effusions. No pneumothorax.       MACRO: None   Signed by: Larry Segal 8/16/2024 6:45 PM Dictation workstation:   [id number]    CT brain attack head wo IV contrast    Result Date: 8/16/2024  Interpreted By:  Esperanza Pickens, STUDY: CT BRAIN ATTACK HEAD WO IV CONTRAST  8/16/2024 6:06 pm   INDICATION: Signs/Symptoms:Stroke Evaluation   COMPARISON: None.   ACCESSION NUMBER(S): [id number]   ORDERING CLINICIAN: MICHELLE LOUIS   TECHNIQUE: Serial, axial CT images of the brain were obtained without IV contrast. Coronal and sagittal reformatted images were performed.   FINDINGS: The ventricles, cisterns and sulci are prominent, consistent with diffuse volume loss.  There are areas of nonspecific white matter hypodensity, which are probably age-related or microvascular in nature. There is encephalomalacia at the right temporooccipital region, suggestive of remote infarct. The gray-white matter differentiation is intact and there is no evidence of acute territorial infarct.  No mass effect or midline shift is seen.  There is no hemorrhage.  No extraaxial fluid collection. No air-fluid levels at the visualized paranasal sinuses. The mastoid air cells are clear. No depressed calvarial fracture.       1.  No acute intracranial hemorrhage or acute territorial infarct. 2.  Diffuse parenchymal volume loss. Chronic microvascular ischemic changes. Encephalomalacia at the right temporooccipital region, suggestive of remote infarct.     MACRO: Esperanza Pickens discussed the significance and urgency of this critical finding by telephone with  Dr. Belle on 8/16/2024 at 6:15 pm.  (**-RCF-**) Findings:  See findings.     Signed by: Esperanza Pickens 8/16/2024 6:19 PM Dictation workstation:   [id number]      CT Head (I personally reviewed the images/tracings  with the following interpretation)  Normal    CTA head/neck (I personally reviewed the images/tracings with the following interpretation)  No evidence of LVO    Stroke Alert CT/MRI review: Actual date and time 8/16 at 18:15      IV Thrombolysis IV Thrombolysis Checklist      IV Thrombolysis Given: Yes Thrombolysis Administration: administration time 18:30 Patient meets inclusion and exclusion criteria with expected benefits exceeding the risks of IV thrombolysis therapy or withholding therapy. Patient/ family understand potential risks & benefits and consent to IV Thrombolysis. Discussed the diagnosis of suspected ischemic stroke and options for treatment, including alternative treatments. For patients meeting inclusion and exclusion criteria, the expected benefits exceed the risks of IV thrombolysis therapy or withholding therapy. The main risk of IV thrombolysis therapy is bleeding into the brain or body that may require blood transfusions or lead to death. In clinical studies, the rate of death was not higher in patients who received IV thrombolysis compared to those who did not. Other risks include allergic reactions, and with any procedure there is always the possibility of an unexpected complication.  Patient is <18 - refer [facility] [place] [facility] Management of pediatric patients with Acute suspected Stroke & the Pediatric IV Thrombolysis Consent. Consent is completed on a paper document and if criteria is met/ benefit outweigh the risk the thrombolytic Alteplase should be given.  :99}Were there delays to thrombolysis administration?: no       Assessment/Plan   Assessment & Plan  Dysarthria    This is a 79 year old male presenting for evaluation of stroke.  He presented with dysarthria and limb ataxia.  TNK was administered at 18:30.  Currently, he has no deficits on exam (NIHSS 0)    Post-TNK Care  - hold AP/AC agents for now until imaging 24 hours post TNK has shown no ICH  - MRI Brain  - Echocardiogram  - goal  BP < 180/105  - goal LDL < 70; continue Atorvastatin  - goal hemoglobin A1C < 6.8  - encouraged cessation of vaping  - neuro checks per protocal  - cardiac telemetry  - SCDs for DVT proph    Case discussed with Dr. English,    Alessio Fischer MD  [facility]  Department of Neurology        Type: Ischemic stroke  Subtype/etiology: likely cerebral microvascular disease  Vessels involved: posterior circulation  Neurological manifestations:  NIHSS (worst at presentation): 2 (limb ataxia, dysarthria)  Diagnostic evaluation: CT head, CTA head/neck  Antiplatelet/antithrombotic plan for stroke prevention: None for 24 hours  VTE prophylaxis: SCDs  Vascular Risk Factor modification goals:  Blood pressure goals: avoid hypotension SBP <100 that could worsen cerebral perfusion, Ischemic stroke post-thrombolysis- BP < 180/105 mmHg for 24hr  Lipid Goals: education on healthy diet and statin therapy to maintain or achieve goal LDL-cholesterol < 70mg  Glucose Goals: early treatment of hyperglycemia to goal glucose 140-180 mg/dl with long-term goal A1c < 7%   Smoking Cessation and Education  Assessment for Rehabilitation needs   Patient and family education on signs and symptoms of stroke, calling 911, healthy strategies for stroke prevention.         I spent 80 minutes in the professional and overall care of this patient.      Alessio Fischer MD

## 2024-08-17 NOTE — H&P
Critical Care Medicine History and Physical        Subjective   Patient is a 79 y.o. male admitted on 8/16/2024  6:04 PM  with chief complaint of Stroke.     HPI:  Justin Contreras is a 80 y/o male with PMH of CAD status post, hypertension, hyperlipidemia, type 2 diabetes noted to the emergency department for strokelike symptoms.  Symptoms started 30 minutes prior to arrival.  He was driving at the time of symptom onset.  He states that he feels like he was just off like he was having trouble with normal function.  He had difficulty with coordinating his left upper and lower extremities.  His wife thought he was slurring his words.  Patient denied any headache, vision changes, acute trauma, chest pain, shortness of breath, recent illnesses.    ED course: Patient was seen down in the ED was given NIHSS of 3 initially due to upper and lower extremity ataxia on the left and mild dysarthria.  Patient had a CT head and CTA completed.  CT head showed no acute intracranial bleed.  CTA showed no large vessel occlusion.  Due to the timing onset patient was given TNK.  He states that he has noted some mild improvement to his symptoms since receiving this medication.    History reviewed. No pertinent past medical history.  History reviewed. No pertinent surgical history.  Medications Prior to Admission   Medication Sig Dispense Refill Last Dose    atorvastatin (Lipitor) 40 mg tablet Take 1 tablet (40 mg) by mouth once daily at bedtime.   8/15/2024    doxazosin (Cardura) 4 mg tablet Take 1 tablet (4 mg) by mouth once daily at bedtime.   8/15/2024    ezetimibe (Zetia) 10 mg tablet Take 1 tablet (10 mg) by mouth once daily.   8/16/2024 at am    finasteride (Proscar) 5 mg tablet Take 1 tablet (5 mg) by mouth once daily at bedtime. Do not crush, chew, or split.   8/15/2024    glipiZIDE (Glucotrol) 5 mg tablet Take 0.5 tablets (2.5 mg) by mouth once daily.   8/16/2024 at am    metoprolol succinate XL (Toprol-XL) 100 mg 24 hr tablet  Take 0.5 tablets (50 mg) by mouth once daily. Do not crush or chew.   8/16/2024 at am    omeprazole (PriLOSEC) 20 mg DR capsule Take 1 capsule (20 mg) by mouth once daily in the morning. Take before meals. Do not crush or chew.   8/16/2024 at am    semaglutide (Ozempic) 1 mg/dose (4 mg/3 mL) pen injector Inject 1 mg under the skin 1 (one) time per week. Monday   Past Week    SITagliptin phos-metformin (Janumet XR)  mg tablet, ER multiphase 24 hr Take 1 tablet by mouth 2 times a day.   8/16/2024 at am     Patient has no known allergies.     No family history on file.    Scheduled Medications:   [START ON 8/17/2024] aspirin, 81 mg, oral, Daily  atorvastatin, 40 mg, oral, Nightly  [START ON 8/17/2024] insulin lispro, 0-5 Units, subcutaneous, TID  polyethylene glycol, 17 g, oral, Daily         Continuous Medications:         PRN Medications:   PRN medications: hydrALAZINE **FOLLOWED BY** [START ON 8/18/2024] hydrALAZINE, labetaloL, oxygen    Review of Systems:  Review of Systems    Objective   Vitals:  24hr Min/Max:  Temp  Min: 35.4 °C (95.7 °F)  Max: 37 °C (98.6 °F)  Pulse  Min: 65  Max: 106  BP  Min: 127/65  Max: 174/85  Resp  Min: 18  Max: 38  SpO2  Min: 98 %  Max: 99 %    Physical exam:    General: Well developed, well nourished, alert and cooperative, appears in no acute distress  Neuro: alert and oriented to person, place and time, CN II-XII intact, some mild ataxia in the left upper extremity none in the left lower extremity, no dysarthria, no aphasia, rest of NIHSS is 0  Eyes: Non-injected conjunctiva, sclera clear  Cardiac: RRR, no murmurs, radial pulses   Lungs: CTA bilaterally, no W/R/R, regular rate  Abdomen: Soft, non-tender, non-distended  Skin: no obvious lesions, no rashes.  Psych: Normal mood, normal affect.    Assessment /Plan      Neuro:   Stroke  -Patient initially had a NIH score of 3.  He was given TNK at 1630.  Admitted for post TNK observation.  -Neurochecks initially every 15 for 1 hour  then Q1 till eventually every 4 per TNK protocol.  Repeat imaging at 24 hours.  -CT head:1.  No acute intracranial hemorrhage or acute territorial infarct.  2.  Diffuse parenchymal volume loss. Chronic microvascular ischemic  changes. Encephalomalacia at the right temporooccipital region,  suggestive of remote infarct.  -Neurology consulted  -[facility] ICU    Cardiac:  Hx of CAD status post CABG  -Blood pressure goals status post TNK.  As needed hydralazine and labetalol ordered.  To keep the systolic less than 180.  -Continue home atorvastatin, metoprolol  - Goals: MAP> 65, HR     Pulmonary:      Continuous pulse oximetry   O2 PRN to maintain SpO2 > 94%, wean as tolerated    Gastrointestinal:   - Diet: Regular diet patient passed swallow evaluation.  - Prophylaxis: Protonix based on home medication  - Bowel regimen: MiraLAX as needed  - Last BM:      Renal:   - Daily CMP,Mg,Phos  Net IO Since Admission: 40 mL [243]  Results from last 72 hours   Lab Units 24  1816   BUN mg/dL 29*   CREATININE mg/dL 1.19       - Electrolytes: Replete per protocol, goal K>4 Phos >3 Mg >2        Endocrine:   Type 2 diabetes  -Holding home diabetic medication.  Sliding scale implemented.  -sliding scale: Moderate  Results from last 7 days   Lab Units 24  2052 24  1816 24  1758   POCT GLUCOSE mg/dL 128*  --  127*   GLUCOSE mg/dL  --  129*  --       -BG < 180 goal    Hematology:   - Daily CBC, coags  - DVT Prophylaxis: Holding due to TNK, scds  Results from last 7 days   Lab Units 24  1816   WBC AUTO x10*3/uL 6.2   HEMOGLOBIN g/dL 10.7*   HEMATOCRIT % 34.5*   PLATELETS AUTO x10*3/uL 155         Infectious Disease:   -No Acute infectious concerns  Temp (24hrs), Av.3 °C (97.3 °F), Min:35.4 °C (95.7 °F), Max:37 °C (98.6 °F)         Musculoskeletal:   - PT/OT to see once cleared from TNK protocol.    Integumentary:   -Per ICU skin protocol    LDA:         CODE STATUS: Full Code    Disposition:  Requires ICU and CCM.    Trang Kapadia DO

## 2024-08-18 ENCOUNTER — APPOINTMENT (OUTPATIENT)
Dept: CARDIOLOGY | Facility: HOSPITAL | Age: 79
DRG: 063 | End: 2024-08-18
Payer: MEDICARE

## 2024-08-18 VITALS
TEMPERATURE: 97 F | HEART RATE: 72 BPM | DIASTOLIC BLOOD PRESSURE: 77 MMHG | WEIGHT: 157.85 LBS | SYSTOLIC BLOOD PRESSURE: 138 MMHG | BODY MASS INDEX: 24.78 KG/M2 | RESPIRATION RATE: 20 BRPM | HEIGHT: 67 IN | OXYGEN SATURATION: 98 %

## 2024-08-18 LAB
ALBUMIN SERPL BCP-MCNC: 3.7 G/DL (ref 3.4–5)
ALP SERPL-CCNC: 65 U/L (ref 33–136)
ALT SERPL W P-5'-P-CCNC: 32 U/L (ref 10–52)
ANION GAP SERPL CALC-SCNC: 12 MMOL/L (ref 10–20)
AORTIC VALVE MEAN GRADIENT: 6.6 MMHG
AORTIC VALVE PEAK VELOCITY: 1.72 M/S
AST SERPL W P-5'-P-CCNC: 16 U/L (ref 9–39)
AV PEAK GRADIENT: 11.8 MMHG
AVA (PEAK VEL): 2.14 CM2
AVA (VTI): 2.14 CM2
BASOPHILS # BLD AUTO: 0.04 X10*3/UL (ref 0–0.1)
BASOPHILS NFR BLD AUTO: 0.6 %
BILIRUB SERPL-MCNC: 0.4 MG/DL (ref 0–1.2)
BUN SERPL-MCNC: 21 MG/DL (ref 6–23)
CALCIUM SERPL-MCNC: 9 MG/DL (ref 8.6–10.3)
CHLORIDE SERPL-SCNC: 108 MMOL/L (ref 98–107)
CO2 SERPL-SCNC: 23 MMOL/L (ref 21–32)
CREAT SERPL-MCNC: 1.09 MG/DL (ref 0.5–1.3)
EGFRCR SERPLBLD CKD-EPI 2021: 70 ML/MIN/1.73M*2
EJECTION FRACTION APICAL 4 CHAMBER: 51
EJECTION FRACTION: 55 %
EOSINOPHIL # BLD AUTO: 0.66 X10*3/UL (ref 0–0.4)
EOSINOPHIL NFR BLD AUTO: 10 %
ERYTHROCYTE [DISTWIDTH] IN BLOOD BY AUTOMATED COUNT: 13.4 % (ref 11.5–14.5)
GLUCOSE BLD MANUAL STRIP-MCNC: 103 MG/DL (ref 74–99)
GLUCOSE BLD MANUAL STRIP-MCNC: 139 MG/DL (ref 74–99)
GLUCOSE SERPL-MCNC: 94 MG/DL (ref 74–99)
HCT VFR BLD AUTO: 35.4 % (ref 41–52)
HGB BLD-MCNC: 10.8 G/DL (ref 13.5–17.5)
IMM GRANULOCYTES # BLD AUTO: 0.04 X10*3/UL (ref 0–0.5)
IMM GRANULOCYTES NFR BLD AUTO: 0.6 % (ref 0–0.9)
LEFT VENTRICLE INTERNAL DIMENSION DIASTOLE: 3.29 CM (ref 3.5–6)
LEFT VENTRICULAR OUTFLOW TRACT DIAMETER: 1.93 CM
LV EJECTION FRACTION BIPLANE: 47 %
LYMPHOCYTES # BLD AUTO: 1.86 X10*3/UL (ref 0.8–3)
LYMPHOCYTES NFR BLD AUTO: 28.1 %
MAGNESIUM SERPL-MCNC: 1.58 MG/DL (ref 1.6–2.4)
MCH RBC QN AUTO: 26.2 PG (ref 26–34)
MCHC RBC AUTO-ENTMCNC: 30.5 G/DL (ref 32–36)
MCV RBC AUTO: 86 FL (ref 80–100)
MITRAL VALVE E/A RATIO: 0.72
MONOCYTES # BLD AUTO: 0.68 X10*3/UL (ref 0.05–0.8)
MONOCYTES NFR BLD AUTO: 10.3 %
NEUTROPHILS # BLD AUTO: 3.34 X10*3/UL (ref 1.6–5.5)
NEUTROPHILS NFR BLD AUTO: 50.4 %
NRBC BLD-RTO: 0 /100 WBCS (ref 0–0)
PHOSPHATE SERPL-MCNC: 3.9 MG/DL (ref 2.5–4.9)
PLATELET # BLD AUTO: 169 X10*3/UL (ref 150–450)
POTASSIUM SERPL-SCNC: 4.3 MMOL/L (ref 3.5–5.3)
PROT SERPL-MCNC: 6.6 G/DL (ref 6.4–8.2)
RBC # BLD AUTO: 4.13 X10*6/UL (ref 4.5–5.9)
RIGHT VENTRICLE FREE WALL PEAK S': 7 CM/S
RIGHT VENTRICLE PEAK SYSTOLIC PRESSURE: 24.4 MMHG
SODIUM SERPL-SCNC: 139 MMOL/L (ref 136–145)
TRICUSPID ANNULAR PLANE SYSTOLIC EXCURSION: 1.1 CM
WBC # BLD AUTO: 6.6 X10*3/UL (ref 4.4–11.3)

## 2024-08-18 PROCEDURE — 2500000002 HC RX 250 W HCPCS SELF ADMINISTERED DRUGS (ALT 637 FOR MEDICARE OP, ALT 636 FOR OP/ED): Performed by: INTERNAL MEDICINE

## 2024-08-18 PROCEDURE — 82947 ASSAY GLUCOSE BLOOD QUANT: CPT

## 2024-08-18 PROCEDURE — 2500000001 HC RX 250 WO HCPCS SELF ADMINISTERED DRUGS (ALT 637 FOR MEDICARE OP): Performed by: INTERNAL MEDICINE

## 2024-08-18 PROCEDURE — 85025 COMPLETE CBC W/AUTO DIFF WBC: CPT | Performed by: INTERNAL MEDICINE

## 2024-08-18 PROCEDURE — 99239 HOSP IP/OBS DSCHRG MGMT >30: CPT | Performed by: INTERNAL MEDICINE

## 2024-08-18 PROCEDURE — 93306 TTE W/DOPPLER COMPLETE: CPT

## 2024-08-18 PROCEDURE — 80053 COMPREHEN METABOLIC PANEL: CPT | Performed by: INTERNAL MEDICINE

## 2024-08-18 PROCEDURE — 2500000004 HC RX 250 GENERAL PHARMACY W/ HCPCS (ALT 636 FOR OP/ED): Performed by: INTERNAL MEDICINE

## 2024-08-18 PROCEDURE — S4991 NICOTINE PATCH NONLEGEND: HCPCS | Performed by: INTERNAL MEDICINE

## 2024-08-18 PROCEDURE — 84100 ASSAY OF PHOSPHORUS: CPT | Performed by: INTERNAL MEDICINE

## 2024-08-18 PROCEDURE — 83735 ASSAY OF MAGNESIUM: CPT | Performed by: INTERNAL MEDICINE

## 2024-08-18 PROCEDURE — 36415 COLL VENOUS BLD VENIPUNCTURE: CPT | Performed by: INTERNAL MEDICINE

## 2024-08-18 PROCEDURE — 99232 SBSQ HOSP IP/OBS MODERATE 35: CPT | Performed by: PSYCHIATRY & NEUROLOGY

## 2024-08-18 RX ORDER — LANOLIN ALCOHOL/MO/W.PET/CERES
400 CREAM (GRAM) TOPICAL NIGHTLY
Qty: 7 TABLET | Refills: 0 | Status: SHIPPED | OUTPATIENT
Start: 2024-08-18 | End: 2024-08-25

## 2024-08-18 RX ORDER — NICOTINE 7MG/24HR
1 PATCH, TRANSDERMAL 24 HOURS TRANSDERMAL DAILY
Qty: 30 PATCH | Refills: 0 | Status: SHIPPED | OUTPATIENT
Start: 2024-08-18 | End: 2024-09-17

## 2024-08-18 RX ORDER — ASPIRIN 81 MG/1
81 TABLET ORAL DAILY
Qty: 21 TABLET | Refills: 0 | Status: SHIPPED | OUTPATIENT
Start: 2024-08-18 | End: 2024-09-08

## 2024-08-18 RX ORDER — CLOPIDOGREL BISULFATE 75 MG/1
75 TABLET ORAL DAILY
Qty: 30 TABLET | Refills: 0 | Status: SHIPPED | OUTPATIENT
Start: 2024-08-18 | End: 2024-09-17

## 2024-08-18 RX ORDER — CLOPIDOGREL BISULFATE 75 MG/1
75 TABLET ORAL DAILY
Status: DISCONTINUED | OUTPATIENT
Start: 2024-08-18 | End: 2024-08-18 | Stop reason: HOSPADM

## 2024-08-18 RX ORDER — ATORVASTATIN CALCIUM 80 MG/1
80 TABLET, FILM COATED ORAL NIGHTLY
Qty: 30 TABLET | Refills: 0 | Status: SHIPPED | OUTPATIENT
Start: 2024-08-18 | End: 2024-09-17

## 2024-08-18 RX ADMIN — Medication 400 MG: at 11:34

## 2024-08-18 RX ADMIN — CLOPIDOGREL BISULFATE 75 MG: 75 TABLET ORAL at 11:35

## 2024-08-18 RX ADMIN — METOPROLOL SUCCINATE 50 MG: 50 TABLET, EXTENDED RELEASE ORAL at 11:35

## 2024-08-18 RX ADMIN — PANTOPRAZOLE SODIUM 40 MG: 40 TABLET, DELAYED RELEASE ORAL at 06:21

## 2024-08-18 RX ADMIN — NICOTINE 1 PATCH: 7 PATCH, EXTENDED RELEASE TRANSDERMAL at 11:34

## 2024-08-18 RX ADMIN — ASPIRIN 81 MG: 81 TABLET, COATED ORAL at 11:35

## 2024-08-18 ASSESSMENT — COGNITIVE AND FUNCTIONAL STATUS - GENERAL
MOBILITY SCORE: 24
DAILY ACTIVITIY SCORE: 24

## 2024-08-18 ASSESSMENT — PAIN SCALES - GENERAL: PAINLEVEL_OUTOF10: 0 - NO PAIN

## 2024-08-18 ASSESSMENT — PAIN - FUNCTIONAL ASSESSMENT: PAIN_FUNCTIONAL_ASSESSMENT: 0-10

## 2024-08-18 NOTE — CARE PLAN
Patient VSS and NVI. Tolerating diet appropriately. Ambulating independently. No acute change in neuro status. BM today. Voiding adequately. No c/o pain. Echo completed today. IV removed. Discharge instructions provided. No further questions at this time. Educated on discharge instructions and importance of follow up appointments as well as medications.     The clinical goals for the shift include Patient will have no acute change in neuro status throughout this shift.

## 2024-08-18 NOTE — PROGRESS NOTES
"Justin Contrreas is a 79 y.o. male on day 2 of admission presenting with Dysarthria.    Subjective   No overnight events       Objective     Last Recorded Vitals  Blood pressure 146/82, pulse 72, temperature 36.1 °C (97 °F), temperature source Temporal, resp. rate 20, height 1.702 m (5' 7.01\"), weight 71.6 kg (157 lb 13.6 oz), SpO2 98%.    Gen: NAD  Neuro:  --HIF: A&O X 3, repetition and naming intact  --CN:  PERRLA, EOMI, VFF, no visible facial asymmetry, facial sensation intact, no tongue or palatal deviation, SCM intact  --Motor: Moves all 4 extremities equally; no focal deficits  --Sensory: Intact to light touch, intact to pinprick  --Reflex: 2+ symmetric, toes down  --Cerebellum: FTN and HTS intact  --Gait: Deferred     Relevant Results    NIH Stroke Scale  1A. Level of Consciousness: Alert, Keenly Responsive  1B. Ask Month and Age: Both Questions Right  1C. Blink Eyes & Squeeze Hands: Performs Both Tasks  2. Best Gaze: Normal  3. Visual: No Visual Loss  4. Facial Palsy: Normal Symmetrical Movements  5A. Motor - Left Arm: No Drift  5B. Motor - Right Arm: No Drift  6A. Motor - Left Leg: No Drift  6B. Motor - Right Leg: No Drift  7. Limb Ataxia: Absent  8. Sensory Loss: Normal  9. Best Language: No Aphasia  10. Dysarthria: Normal  11. Extinction and Inattention: No Abnormality  NIH Stroke Scale: 0           [place] Coma Scale  Best Eye Response: Spontaneous  Best Verbal Response: Oriented  Best Motor Response: Follows commands  [place] Coma Scale Score: 15                       Echocardiogram:  PHYSICIAN INTERPRETATION:  Left Ventricle: Left ventricular ejection fraction is normal, by visual estimate at 55%. There are no regional wall motion abnormalities. The left ventricular cavity size is normal. There is mild asymmetric left ventricular hypertrophy. Spectral Doppler shows an impaired relaxation pattern of left ventricular diastolic filling.  Left Atrium: The left atrium is mildly dilated.  Right Ventricle: The " right ventricle is normal in size. There is normal right ventricular global systolic function.  Right Atrium: The right atrium is upper limits of normal in size.  Aortic Valve: The aortic valve was not assessed. There is mild to moderate aortic valve cusp calcification. There is evidence of mild aortic valve stenosis.  The aortic valve dimensionless index is 0.74. There is trace aortic valve regurgitation. The peak instantaneous gradient of the aortic valve is 11.8 mmHg. The mean gradient of the aortic valve is 6.6 mmHg.  Mitral Valve: The mitral valve is normal in structure. There is trace to mild mitral valve regurgitation.  Tricuspid Valve: The tricuspid valve is structurally normal. There is mild tricuspid regurgitation.  Pulmonic Valve: The pulmonic valve was not assessed. The pulmonic valve regurgitation was not assessed.  Pericardium: There is a trivial pericardial effusion.  Aorta: The aortic root is normal.           Assessment/Plan      Assessment & Plan  Dysarthria        TNK Aborted Stroke  - MRI Brain reviewed; no evidence of acute stroke; chronic infarct noted in the right temporal-occipital region  - dAPT for 21 days followed by Plavix 75 mg/day monotherapy  - aggressive control of vascular risk factors:      Goal BP < 130/80      Goal LDL < 70; continue Atorvastatin      Goal Hemoglobin A1C: < 7.0      Discussed with patient importance of cessation of vaping  - recommend 150 minutes of moderately intense exercise per week  - recommend Mediterranean diet  - patient to discuss with his cardiologist about prolonged heart monitoring  - follow up with me in 6 weeks             Alessio Fischer MD

## 2024-08-18 NOTE — HOSPITAL COURSE
79-year-old male with past medical history of CAD status post CABG, hypertension, hyperlipidemia, type 2 diabetes mellitus presented to emergency department for strokelike symptoms.  Patient was found to have NIH stroke scale of 3 due to upper and lower extremity ataxia on the left and mild dysarthria.  Patient had CT head and CTA which showed no acute intracranial abnormality or large vessel occlusion.  Given the recent onset of all the symptoms patient was given TNK and had complete resolution of the symptoms.Patient was seen and evaluated by neurology during the hospital stay and the recommended aspirin and Plavix for 21 days followed by Plavix alone.  Patient worked with physical therapy and has no needs after discharge and he is ambulating well in the hospital without any assistant.  Currently patient is in stable condition for discharge with follow-up with neurology as outpatient.    38 minutes spent in discharge timing talking with neurology and patient's daughter who is a pediatrician and coordinating care with patient

## 2024-08-18 NOTE — DISCHARGE SUMMARY
Discharge Diagnosis  Dysarthria secondary to CVA    Issues Requiring Follow-Up  Follow-up with primary care provider and neurology after discharge    Discharge Meds     Your medication list        START taking these medications        Instructions Last Dose Given Next Dose Due   aspirin 81 mg EC tablet      Take 1 tablet (81 mg) by mouth once daily for 21 days. Take this medication along with Plavix for 21 days and then stop aspirin and continue with Plavix       clopidogrel 75 mg tablet  Commonly known as: Plavix      Take 1 tablet (75 mg) by mouth once daily.       magnesium oxide 400 mg (241.3 mg magnesium) tablet  Commonly known as: Mag-Ox      Take 1 tablet (400 mg) by mouth once daily at bedtime for 7 days.       nicotine 7 mg/24 hr patch  Commonly known as: Nicoderm CQ      Place 1 patch over 24 hours on the skin once daily.              CHANGE how you take these medications        Instructions Last Dose Given Next Dose Due   atorvastatin 80 mg tablet  Commonly known as: Lipitor  What changed:   medication strength  how much to take      Take 1 tablet (80 mg) by mouth once daily at bedtime.              CONTINUE taking these medications        Instructions Last Dose Given Next Dose Due   doxazosin 4 mg tablet  Commonly known as: Cardura           ezetimibe 10 mg tablet  Commonly known as: Zetia           finasteride 5 mg tablet  Commonly known as: Proscar           glipiZIDE 5 mg tablet  Commonly known as: Glucotrol           Janumet XR  mg tablet, ER multiphase 24 hr  Generic drug: SITagliptin phos-metformin           metoprolol succinate  mg 24 hr tablet  Commonly known as: Toprol-XL           omeprazole 20 mg DR capsule  Commonly known as: PriLOSEC           Ozempic 1 mg/dose (4 mg/3 mL) pen injector  Generic drug: semaglutide                     Where to Get Your Medications        These medications were sent to [facility]/pharmacy #[id number] - [place], [place] - [address] [place] [address]  [place], [facility] [address]      Phone: [phone]   aspirin 81 mg EC tablet  atorvastatin 80 mg tablet  clopidogrel 75 mg tablet  magnesium oxide 400 mg (241.3 mg magnesium) tablet  nicotine 7 mg/24 hr patch         Test Results Pending At Discharge  Pending Labs       No current pending labs.            Hospital Course  79-year-old male with past medical history of CAD status post CABG, hypertension, hyperlipidemia, type 2 diabetes mellitus presented to emergency department for strokelike symptoms.  Patient was found to have NIH stroke scale of 3 due to upper and lower extremity ataxia on the left and mild dysarthria.  Patient had CT head and CTA which showed no acute intracranial abnormality or large vessel occlusion.  Given the recent onset of all the symptoms patient was given TNK and had complete resolution of the symptoms.Patient was seen and evaluated by neurology during the hospital stay and the recommended aspirin and Plavix for 21 days followed by Plavix alone.  Patient worked with physical therapy and has no needs after discharge and he is ambulating well in the hospital without any assistant.  Currently patient is in stable condition for discharge with follow-up with neurology as outpatient.    38 minutes spent in discharge timing talking with neurology and patient's daughter who is a pediatrician and coordinating care with patient    Pertinent Physical Exam At Time of Discharge  General: Not in acute distress, alert.  On room air  HEENT: PERRLA, head intact and normocephalic  Neck: Normal to inspection  Lungs: Clear to auscultation, work of breathing within normal limit  Cardiac: Regular rate and rhythm.  Grade 2-3 systolic murmur over the aortic area  Abdomen: Soft nontender, positive bowel sounds  : Exam deferred  Skin: Intact  Hematology: No petechia or excessive ecchymosis  Musculoskeletal: Without significant trauma  Neurological: Alert awake oriented, no focal deficit, cranial nerves  grossly intact  Psych: No suicidal ideation or homicidal ideation    Outpatient Follow-Up  No future appointments.      Isauro Ray MD   No

## 2024-08-18 NOTE — SIGNIFICANT EVENT
Patient accepted in transfer from the intensive care unit as a medical downgrade following 24 hours of observation after receiving TNK.  Briefly, patient is a 79-year-old male who presented to this facility with dizziness, slurred speech, ataxia.  Symptoms were sudden onset nature however persisted upon initial evaluation in the emergency department.  Past medical history of colon cancer, CABG, hypertension.  Post TNK, NIH evaluation yielded a score of 0.  Currently ANO x 3 with repetition and naming intact.  Moves all extremities equally and without focal deficits.  Underlying mechanism remains unclear.  To transfer to telemetry bed with every 4 hour neurochecks maintained    Additional recommendations per neurology service are as follows:    -MRI of the brain which was completed and showed no evidence of ischemic or hemorrhagic process.    -Goal blood pressure <180/105.  As needed labetalol has been made available.  Every 4 hour vital signs to be maintained    -Continue home dose atorvastatin with goal LDL of less than 70    -Current hemoglobin noted to be 6.8.  Dietary modifications, blood glucose monitoring, further outpatient follow-up indicated at this time.    -Maintain telemetry    -SCDs for DVT prophylaxis    Anticipate medical readiness for discharge within the next 24 hours however this is pending further neurology evaluation and their ultimate clearance for discharge.

## 2024-08-18 NOTE — NURSING NOTE
2230 Pt transferred to room 3026. Report given to Jourdan prior to transfer. Pt's daughter at bedside and aware of the transfer. [facility] dr SERGIO Kapadia spoke with daughter and pt about the MRI results.  Pt pleasant and cooperative with plan of care.   Bedside neuro handoff done in room 3026 with Willie NICOLE. Pt has no neuro deficits at this time. He does have a history of c/o numbness to both feet and left fingers but denies numbness at this time.   Daughter remains at bedside.

## 2024-08-26 LAB
ATRIAL RATE: 73 BPM
P AXIS: 42 DEGREES
P OFFSET: 188 MS
P ONSET: 138 MS
PR INTERVAL: 162 MS
Q ONSET: 219 MS
QRS COUNT: 12 BEATS
QRS DURATION: 80 MS
QT INTERVAL: 362 MS
QTC CALCULATION(BAZETT): 398 MS
QTC FREDERICIA: 386 MS
R AXIS: 7 DEGREES
T AXIS: 37 DEGREES
T OFFSET: 400 MS
VENTRICULAR RATE: 73 BPM

## 2024-08-30 ENCOUNTER — TELEPHONE (OUTPATIENT)
Dept: NEUROLOGY | Facility: CLINIC | Age: 79
End: 2024-08-30
Payer: COMMERCIAL

## 2024-08-30 NOTE — TELEPHONE ENCOUNTER
Pts wife called to tell you that he is feeling very weak and would like you to give him a call back to see if he needs to go to the ER. He is also requesting an appt in Sept for a TIA, do you have anything in mind?

## 2024-09-19 ENCOUNTER — APPOINTMENT (OUTPATIENT)
Dept: NEUROLOGY | Facility: CLINIC | Age: 79
End: 2024-09-19
Payer: COMMERCIAL

## 2024-09-19 VITALS
SYSTOLIC BLOOD PRESSURE: 100 MMHG | WEIGHT: 153.4 LBS | DIASTOLIC BLOOD PRESSURE: 62 MMHG | HEIGHT: 67 IN | TEMPERATURE: 96.9 F | BODY MASS INDEX: 24.08 KG/M2

## 2024-09-19 DIAGNOSIS — G62.9 POLYNEUROPATHY: Primary | ICD-10-CM

## 2024-09-19 DIAGNOSIS — I63.9 STROKE ABORTED BY ADMINISTRATION OF THROMBOLYTIC AGENT (MULTI): ICD-10-CM

## 2024-09-19 PROCEDURE — 1036F TOBACCO NON-USER: CPT | Performed by: PSYCHIATRY & NEUROLOGY

## 2024-09-19 PROCEDURE — 1159F MED LIST DOCD IN RCRD: CPT | Performed by: PSYCHIATRY & NEUROLOGY

## 2024-09-19 PROCEDURE — 99214 OFFICE O/P EST MOD 30 MIN: CPT | Performed by: PSYCHIATRY & NEUROLOGY

## 2024-09-19 RX ORDER — METFORMIN HYDROCHLORIDE 500 MG/1
500 TABLET ORAL
COMMUNITY

## 2024-09-19 RX ORDER — LOSARTAN POTASSIUM 25 MG/1
TABLET ORAL
COMMUNITY

## 2024-09-19 RX ORDER — PANTOPRAZOLE SODIUM 20 MG/1
TABLET, DELAYED RELEASE ORAL
COMMUNITY
Start: 2024-09-09

## 2024-09-19 RX ORDER — GLUCOSAM/CHONDRO/HERB 149/HYAL 750-100 MG
1200 TABLET ORAL DAILY
COMMUNITY

## 2024-09-19 RX ORDER — ASPIRIN 81 MG/1
81 TABLET ORAL DAILY
COMMUNITY

## 2024-09-19 ASSESSMENT — LIFESTYLE VARIABLES
HOW OFTEN DO YOU HAVE SIX OR MORE DRINKS ON ONE OCCASION: NEVER
AUDIT-C TOTAL SCORE: 2
HOW MANY STANDARD DRINKS CONTAINING ALCOHOL DO YOU HAVE ON A TYPICAL DAY: 1 OR 2
SKIP TO QUESTIONS 9-10: 1
HOW OFTEN DO YOU HAVE A DRINK CONTAINING ALCOHOL: 2-4 TIMES A MONTH

## 2024-09-19 NOTE — PROGRESS NOTES
Chief Complaint: Hospitalization Follow up    HPI  79 y.o. male presenting for follow up regarding above.    Since the hospitalization, he has noted increased drowsiness.  He sleeps 8 hours per night.  He is now having to nap 1-2 hours a day.  After the hospitalization, he noted increasing numbness and tingling in his hands and feet.   He has noted some weakness.  He describes difficulty climbing stairs.  He denies any difficulty with balance.     He denies any double vision, loss of peripheral vision, slurred speech/difficulty getting the words out, focal weakness, or focal numbness.           Current Outpatient Medications:     aspirin 81 mg EC tablet, Take 1 tablet (81 mg) by mouth once daily., Disp: , Rfl:     atorvastatin (Lipitor) 80 mg tablet, Take 1 tablet (80 mg) by mouth once daily at bedtime., Disp: 30 tablet, Rfl: 0    clopidogrel (Plavix) 75 mg tablet, Take 1 tablet (75 mg) by mouth once daily., Disp: 30 tablet, Rfl: 0    doxazosin (Cardura) 4 mg tablet, Take 1 tablet (4 mg) by mouth once daily at bedtime., Disp: , Rfl:     ezetimibe (Zetia) 10 mg tablet, Take 1 tablet (10 mg) by mouth once daily., Disp: , Rfl:     finasteride (Proscar) 5 mg tablet, Take 1 tablet (5 mg) by mouth once daily at bedtime. Do not crush, chew, or split., Disp: , Rfl:     glipiZIDE (Glucotrol) 5 mg tablet, Take 0.5 tablets (2.5 mg) by mouth once daily., Disp: , Rfl:     losartan (Cozaar) 25 mg tablet, Take by mouth., Disp: , Rfl:     metFORMIN (Glucophage) 500 mg tablet, Take 1 tablet (500 mg) by mouth 2 times daily (morning and late afternoon)., Disp: , Rfl:     metoprolol succinate XL (Toprol-XL) 100 mg 24 hr tablet, Take 0.5 tablets (50 mg) by mouth once daily. Do not crush or chew., Disp: , Rfl:     omega 3-dha-epa-fish oil (Fish OiL) 1,000 (120-180) mg capsule, Take 1,200 mg by mouth once daily., Disp: , Rfl:     pantoprazole (ProtoNix) 20 mg EC tablet, , Disp: , Rfl:     semaglutide (Ozempic) 1 mg/dose (4 mg/3 mL) pen  "injector, Inject 1 mg under the skin 1 (one) time per week. Monday, Disp: , Rfl:     SITagliptin phos-metformin (Janumet XR)  mg tablet, ER multiphase 24 hr, Take 1 tablet by mouth 2 times a day., Disp: , Rfl:     nicotine (Nicoderm CQ) 7 mg/24 hr patch, Place 1 patch over 24 hours on the skin once daily., Disp: 30 patch, Rfl: 0      Objective:  /62 (BP Location: Right arm, Patient Position: Sitting, BP Cuff Size: Adult)   Temp 36.1 °C (96.9 °F) (Temporal)   Ht 1.702 m (5' 7\")   Wt 69.6 kg (153 lb 6.4 oz)   BMI 24.03 kg/m²     Gen: NAD  Neuro:  --HIF: A&O X 3, repetition and naming intact  --CN:  PERRLA, EOMI, VFF, no visible facial asymmetry, facial sensation intact, no tongue or palatal deviation, SCM intact  --Motor: Moves all 4 extremities equally; no focal deficits  --Sensory:  Pin is reduced in the toes; vibration is reduced in the toes  --Reflex: 2+ in UE, 1+ patella, absent achilles  --Cerebellum: FTN and HTS intact  --Gait: Normal, narrow based gait    Relevant Results  LDL 49  Hba1C: 6.8    Assessment:    TNK Aborted Stroke  - no new stroke like symptoms  - okay to DC Plavix  - continue ASA 81 mg/day  - aggressive control of vascular risk factors:     Goal BP < 130/80     Goal LDL< 70; continue Atorvastatin     Goal hbA1C < 7.0     Patient has stopped vaping  - recommend 150 minutes of moderately intense exercise per week  - recommend Mediterranean diet    2.  Polyneuropathy  - likely secondary to Diabetes  - check B12, SPEP with BEATRIZ      Follow up in 6 months      Alessio Fischer MD  [facility]  Department of Neurology    "

## 2025-03-19 ENCOUNTER — APPOINTMENT (OUTPATIENT)
Dept: NEUROLOGY | Facility: CLINIC | Age: 80
End: 2025-03-19
Payer: COMMERCIAL

## 2025-05-14 ENCOUNTER — APPOINTMENT (OUTPATIENT)
Dept: NEUROLOGY | Facility: CLINIC | Age: 80
End: 2025-05-14
Payer: COMMERCIAL